# Patient Record
Sex: FEMALE | Race: WHITE | NOT HISPANIC OR LATINO | Employment: FULL TIME
[De-identification: names, ages, dates, MRNs, and addresses within clinical notes are randomized per-mention and may not be internally consistent; named-entity substitution may affect disease eponyms.]

---

## 2023-07-31 ENCOUNTER — TRANSCRIBE ORDERS (OUTPATIENT)
Dept: OTHER | Age: 58
End: 2023-07-31

## 2023-07-31 ENCOUNTER — PRE VISIT (OUTPATIENT)
Dept: ONCOLOGY | Facility: CLINIC | Age: 58
End: 2023-07-31
Payer: COMMERCIAL

## 2023-07-31 DIAGNOSIS — C50.919 BREAST CANCER (H): Primary | ICD-10-CM

## 2023-07-31 NOTE — TELEPHONE ENCOUNTER
Action    Action Taken 7/31/23  WT from NPS - pt originally diagnosed in 3/2023, all treatment through [facility]. Has not done Radiation Therapy yet. Has not been outside of  for a 2nd opinion, or anything else.  3:10 PM    8/2/23 - Images resolved in PACS. Records complete - MMT 2:34PM      RECORDS STATUS - BREAST    RECORDS REQUESTED FROM: [facility]   DATE REQUESTED:    NOTES DETAILS STATUS   OFFICE NOTE from medical oncologist CE -  Dr. Glenn Walters: 7/7/23   OFFICE NOTE from surgeon CE -  Dr. Gillian Martinez: 6/21/23   OPERATIVE REPORT [facility] 9/30/22: Cervical Cone w/ Loop Cautery and Colposcopy   MEDICATION LIST [facility]   CHEMOTHERAPY [facility] 7/28/23   LABS     PATHOLOGY REPORTS  (Tissue diagnosis, Stage, ER/SD percentage positive and intensity of staining, HER2 IHC, FISH, and all biopsies from breast and any distant metastasis)                     GENONOMIC TESTING     TYPE:   (Next Generation Sequencing, including Foundation One testing, and Oncotype score) [facility] - Received 8/1 3/2023   IMAGING (NEED IMAGES & REPORT)     CT SCANS Received 8/1 3/31/23: HP   MRI Received 8/1 3/23/23: HP   MAMMO Received 8/1 11/10/15 - 3/14/23: HP   ULTRASOUND Received 8/1 3/14/23: HP   BONE SCAN Received 8/1 3/31/23: HP

## 2023-08-17 DIAGNOSIS — C50.911 MALIGNANT NEOPLASM OF RIGHT BREAST (H): Primary | ICD-10-CM

## 2023-08-18 ENCOUNTER — PATIENT OUTREACH (OUTPATIENT)
Dept: ONCOLOGY | Facility: CLINIC | Age: 58
End: 2023-08-18
Payer: COMMERCIAL

## 2023-08-18 NOTE — PROGRESS NOTES
New Patient Oncology Nurse Navigator Note     Referring provider: Self Referral      Referring Clinic/Organization: [facility] is current place of treatment      Referred to (specialty:) Cancer Surgery     Requested provider (if applicable): Dr. Jacob Go     Date Referral Received: August 18, 2023     Evaluation for:  Breast cancer     Clinical History (per Nurse review of records provided):      Right breat cancer, triple negative, stage IIB (T2,N1,M0), Clinical prognostic stage IIIB (T2,N1,M0), G3, triple negative.    Genetic Counsellor visit 3/22/23 and genetic test 3/18/23 which came back negative for BRCA1/2 Panel. Her family Hx is negative for breast cancer.    Patient felt right breast mass with enlarged right axillary lymph node in March of this year. Last mammogram was in April 2022 with benign findings. Diagnostic mammogram with breast ultrasound done on 03/14/2023 showed right breast mass with suspicious internal calcification 2.5 cm at 11:00 anterior depth, in addition to enlarged lymph nodes in the right axilla.    Right breast mass at 11:00 2 cm from nipple and axillary lymph node were both biopsied on 03/14/2023 showed intraductal carcinoma, Monroe grade 3 , HR negative, HER2 negative (IHC 1+). Right Axillary lymph node biopsy came positive for metastatic ductal carcinoma.    Breast MRI on 03/23/2023 showed right breast mass at 11:00 measures 3.1 X 3.0 X 2.4 cm with enlarged right axillary lymph node. Also MRI showed abnormal enhancement and edema in the anterior aspect of the right rib just below the level of right breast malignancy this could be related to trauma or metastatic disease and more evaluation with PET scan was recommended. Unfortunately not approve PET scan and once scan with CT cap was ordered.     FINAL DIAGNOSIS 3/14/23  A. Breast, right, Mass 11:00 2cm FN, needle core biopsy:  Invasive ductal carcinoma, Monroe grade 3  Estrogen receptor/ progesterone receptor/ Her2  to follow in an addendum    B. Lymph node, RIGHT Axillary Lymph Node, needle core biopsy:  Metastatic ductal carcinoma    Estrogen Receptor (ER) Status: Negative (<1%)  Progesterone Receptor (MA): Negative (<1%)  HER2 by Immunohistochemistry: Negative (score: 1+)    EXAM: MM MAMMOGRAM JOEY BENTONAT W 3D FINA, MM US BREAST RT 3/14/23  INDICATION: Palpable lump in the right breast for one month  COMPARISON: Prior screening mammograms, most recently 04/01/2022    MAMMOGRAPHIC FINDINGS: Bilateral full-field digital diagnostic mammograms performed. The breasts are heterogeneously dense, which may obscure small masses. Images evaluated with the assistance of CAD. Breast tomosynthesis was used in interpretation. Mammogram shows a new mass with suspicious internal calcifications in the right breast at 11:00 anterior depth. Nothing for malignancy in the left breast. Enlarged lymph node in the right axilla.    ULTRASOUND FINDINGS: Ultrasound of the palpable lump shows an irregular hypoechoic mass with marked internal vascularity, and internal calcifications, measuring 2.5 x 1.7 x 2.2 cm. There are 2 abnormal lymph nodes in the right axilla with loss of normal fatty hilum and loss of normal morphology. The larger measures 2.2 x 1.7 x 1.5 cm.    IMPRESSION:  Highly suspicious right breast mass with right axillary adenopathy. Recommend 2 site biopsy, of the palpable right breast mass, and of the largest axillary node.    MRI breast 3/23/23  IMPRESSION: ACR BI-RADS Category 6: Known Biopsy-Proven Malignancy.  1. Biopsy-proven right breast cancer at 11:00 measuring 3.1 x 3.0 x 2.4 cm.    2. Enlarged right axillary lymph node representing known metastatic disease. There are 1-2 additional lymph nodes at level 1 with minimally thickened cortices but normal morphology.    3. Abnormal enhancement and edema in the anterior aspect of right rib just below the level of the right breast malignancy. This could be related to trauma or  metastatic disease. Further evaluation is recommended with cross-sectional imaging; the patient is scheduled for PET/CT on 03/29/2023.    CT CAP showed no evidence of distant metastasis.    Met with Dr. Glenn Walters with [facility] for consultation in 4/2023 and discussed the following plan: neoadjuvant treatment as per keynote 522 which include 2 phases of chemotherapy (carboplatin/paclitaxel plus pembrolizumab followed by Adriamycin/cyclophosphamide plus pembrolizumab) followed by surgery with lymph node dissection and radiation and if no residual disease patient will proceed with pembrolizumab to complete 1 year of treatment, however in case of residual disease then Xeloda can be offered.   RIGHT BREAST: ACR BI-RADS Category 6: Known Biopsy-Proven Malignancy.  LEFT BREAST: ACR BI-RADS Category 1: Negative.        Records Location: Care Everywhere     Records Needed: NA     Additional testing needed prior to consult:     Right breast and axilla US.     Payor: BCBS / Plan: BCBS OUT OF STATE / Product Type: Indemnity /     August 18, 2023    Called patient to introduced myself and role as nurse navigator with [facility] Hematology/Oncology department and to inform them that we have received a self referral for a diagnosis of breast cancer.     Patient did not answer the phone so a detailed message was left for them including NPS number. Requested callback to speak to a  to set the consult date/time/location. Explained to patient in the message that they will receive a call from our new patient scheduling team (NPS number below, hours are Monday - Friday 8am - 4:30 pm) in the next 1-2 business days to schedule the consultation. Encouraged them to call back with any questions.       Tammy Barba, RN, BSN  [facility] Hematology/Oncology Nurse Navigator  [phone]

## 2023-09-15 NOTE — TELEPHONE ENCOUNTER
RECORDS STATUS - BREAST    RECORDS REQUESTED FROM: [facility] - Please see Pre Visit  for Previous Records Gathering   DATE REQUESTED:    NOTES DETAILS STATUS   OFFICE NOTE from medical oncologist CE - HP Dr. Glenn Walters: 23   LABS     PATHOLOGY REPORTS  (Tissue diagnosis, Stage, ER/WI percentage positive and intensity of staining, HER2 IHC, FISH, and all biopsies from breast and any distant metastasis)                 [facility], Reports in CE, slides requested 9/15  [facility] Trackin 3/14/23: [id number]

## 2023-09-19 NOTE — PROGRESS NOTES
Path Slides September 19, 2023  12:33 PM  TJ   Action Taken Slides [id number] from [facility] received and sent to 5th floor path lab for review.

## 2023-09-21 ENCOUNTER — LAB REQUISITION (OUTPATIENT)
Dept: LAB | Facility: CLINIC | Age: 58
End: 2023-09-21
Payer: COMMERCIAL

## 2023-09-21 PROCEDURE — 88321 CONSLTJ&REPRT SLD PREP ELSWR: CPT | Performed by: STUDENT IN AN ORGANIZED HEALTH CARE EDUCATION/TRAINING PROGRAM

## 2023-09-24 LAB
PATH REPORT.COMMENTS IMP SPEC: ABNORMAL
PATH REPORT.COMMENTS IMP SPEC: YES
PATH REPORT.FINAL DX SPEC: ABNORMAL
PATH REPORT.GROSS SPEC: ABNORMAL
PATH REPORT.MICROSCOPIC SPEC OTHER STN: ABNORMAL
PATH REPORT.RELEVANT HX SPEC: ABNORMAL
PATH REPORT.RELEVANT HX SPEC: ABNORMAL
PATH REPORT.SITE OF ORIGIN SPEC: ABNORMAL

## 2023-09-25 ENCOUNTER — PRE VISIT (OUTPATIENT)
Dept: ONCOLOGY | Facility: CLINIC | Age: 58
End: 2023-09-25

## 2023-09-25 ENCOUNTER — ANCILLARY PROCEDURE (OUTPATIENT)
Dept: MAMMOGRAPHY | Facility: CLINIC | Age: 58
End: 2023-09-25
Attending: SURGERY
Payer: COMMERCIAL

## 2023-09-25 ENCOUNTER — PATIENT OUTREACH (OUTPATIENT)
Dept: ONCOLOGY | Facility: CLINIC | Age: 58
End: 2023-09-25

## 2023-09-25 ENCOUNTER — ONCOLOGY VISIT (OUTPATIENT)
Dept: ONCOLOGY | Facility: CLINIC | Age: 58
End: 2023-09-25
Attending: SURGERY
Payer: COMMERCIAL

## 2023-09-25 VITALS
HEART RATE: 99 BPM | DIASTOLIC BLOOD PRESSURE: 76 MMHG | SYSTOLIC BLOOD PRESSURE: 132 MMHG | BODY MASS INDEX: 25.08 KG/M2 | RESPIRATION RATE: 16 BRPM | OXYGEN SATURATION: 100 % | WEIGHT: 146.9 LBS | HEIGHT: 64 IN

## 2023-09-25 DIAGNOSIS — C50.911 MALIGNANT NEOPLASM OF RIGHT BREAST (H): ICD-10-CM

## 2023-09-25 DIAGNOSIS — C50.919 BREAST CANCER (H): ICD-10-CM

## 2023-09-25 PROCEDURE — 99213 OFFICE O/P EST LOW 20 MIN: CPT | Performed by: SURGERY

## 2023-09-25 PROCEDURE — 77066 DX MAMMO INCL CAD BI: CPT | Performed by: RADIOLOGY

## 2023-09-25 PROCEDURE — 76642 ULTRASOUND BREAST LIMITED: CPT | Mod: RT | Performed by: RADIOLOGY

## 2023-09-25 PROCEDURE — 99203 OFFICE O/P NEW LOW 30 MIN: CPT | Performed by: SURGERY

## 2023-09-25 PROCEDURE — G0279 TOMOSYNTHESIS, MAMMO: HCPCS | Performed by: RADIOLOGY

## 2023-09-25 RX ORDER — TRAZODONE HYDROCHLORIDE 50 MG/1
100 TABLET, FILM COATED ORAL AT BEDTIME
COMMUNITY
Start: 2023-09-19

## 2023-09-25 RX ORDER — GABAPENTIN 300 MG/1
300 CAPSULE ORAL AT BEDTIME
COMMUNITY
Start: 2023-08-18 | End: 2024-08-17

## 2023-09-25 RX ORDER — OLANZAPINE 5 MG/1
5 TABLET ORAL AT BEDTIME
COMMUNITY

## 2023-09-25 RX ORDER — ONDANSETRON 8 MG/1
8 TABLET, FILM COATED ORAL EVERY 8 HOURS PRN
COMMUNITY
Start: 2023-07-28

## 2023-09-25 RX ORDER — LIDOCAINE/PRILOCAINE 2.5 %-2.5%
CREAM (GRAM) TOPICAL PRN
COMMUNITY
Start: 2023-04-14

## 2023-09-25 RX ORDER — PROCHLORPERAZINE MALEATE 10 MG
10 TABLET ORAL EVERY 8 HOURS PRN
COMMUNITY

## 2023-09-25 RX ORDER — DEXTROAMPHETAMINE SACCHARATE, AMPHETAMINE ASPARTATE, DEXTROAMPHETAMINE SULFATE AND AMPHETAMINE SULFATE 3.75; 3.75; 3.75; 3.75 MG/1; MG/1; MG/1; MG/1
15 TABLET ORAL DAILY
COMMUNITY

## 2023-09-25 RX ORDER — AMLODIPINE BESYLATE 5 MG/1
5 TABLET ORAL DAILY
COMMUNITY

## 2023-09-25 RX ORDER — DEXAMETHASONE 4 MG/1
4 TABLET ORAL 2 TIMES DAILY WITH MEALS
COMMUNITY

## 2023-09-25 ASSESSMENT — PAIN SCALES - GENERAL: PAINLEVEL: NO PAIN (0)

## 2023-09-25 NOTE — LETTER
9/25/2023         RE: Marline Ovalle  [address]  [place] [address]        Dear Colleague,    Thank you for referring your patient, Marline Ovalle, to the [facility]. Please see a copy of my visit note below.    Patient is a 58-year-old woman with a diagnosis of a T2N0 triple negative breast cancer.  She was found to have a 2.7 cm right breast cancer with a palpable enlarged right axillary lymph node.  She underwent biopsy of both her breast and her lymph node in March which demonstrated triple negative breast cancer.  She had a metastatic work-up which was negative.  She underwent genetic testing which was negative.  She has received chemotherapy and pembrolizumab.  She had a follow-up mammogram and ultrasound today.  The mammogram and ultrasound demonstrate significant reduction in the size of both the lymph node and the breast tumor.  She is now here to talk about treatment options.    Impression: T2 N1 M0 triple negative breast cancer    Plan: I talked to her about various treatment options.  She does desire breast conserving surgery.  I have recommended a seed localized lumpectomy.  I recommended a sentinel lymph node biopsy with a seed localized excision of her previously clipped lymph node.  I talked about the risk and complications of the procedure and she wishes to proceed.    Total time equals 30 minutes which included reviewing her imaging and the in person visit      Jacob Go MD

## 2023-09-25 NOTE — NURSING NOTE
"Oncology Rooming Note    September 25, 2023 4:24 PM   Marline Ovalle is a 58 year old female who presents for:    Chief Complaint   Patient presents with    Oncology Clinic Visit     Breast Cancer Second opinion     Initial Vitals: /76 (BP Location: Right arm, Patient Position: Sitting, Cuff Size: Adult Regular)   Pulse 99   Resp 16   Ht 1.626 m (5' 4\")   Wt 66.6 kg (146 lb 14.4 oz)   SpO2 100%   BMI 25.22 kg/m   Estimated body mass index is 25.22 kg/m  as calculated from the following:    Height as of this encounter: 1.626 m (5' 4\").    Weight as of this encounter: 66.6 kg (146 lb 14.4 oz). Body surface area is 1.73 meters squared.  No Pain (0) Comment: Data Unavailable   No LMP recorded.  Allergies reviewed: Yes  Medications reviewed: Yes    Medications: Medication refills not needed today.  Pharmacy name entered into [employer]: [id number] - [place], [place] - [address]    Clinical concerns: Second Opinion       Anya Hsieh LPN  9/25/2023              "

## 2023-09-25 NOTE — PROGRESS NOTES
Patient is a 58-year-old woman with a diagnosis of a T2N0 triple negative breast cancer.  She was found to have a 2.7 cm right breast cancer with a palpable enlarged right axillary lymph node.  She underwent biopsy of both her breast and her lymph node in March which demonstrated triple negative breast cancer.  She had a metastatic work-up which was negative.  She underwent genetic testing which was negative.  She has received chemotherapy and pembrolizumab.  She had a follow-up mammogram and ultrasound today.  The mammogram and ultrasound demonstrate significant reduction in the size of both the lymph node and the breast tumor.  She is now here to talk about treatment options.    Impression: T2 N1 M0 triple negative breast cancer    Plan: I talked to her about various treatment options.  She does desire breast conserving surgery.  I have recommended a seed localized lumpectomy.  I recommended a sentinel lymph node biopsy with a seed localized excision of her previously clipped lymph node.  I talked about the risk and complications of the procedure and she wishes to proceed.    Total time equals 30 minutes which included reviewing her imaging and the in person visit

## 2023-09-25 NOTE — PROGRESS NOTES
[facility]: Surgical Oncology Plan of Care Education Note                                     Discussion with Patient:                                                         Met with Marline following her visit with Dr. Go on 9/25/2023. Introduced self and explained role of RNCC.       Plan:                                                       Reviewed plan for right tag localized lumpectomy and tag localized sentinel lymph node biopsy at the [facility]. Discussed need for H&P within 30 days of surgery. Marline prefers to schedule with her PCP. Reviewed shower instructions and provided written hand-out and bottle of surgical scrub.     The process for scheduling the tag placement was reviewed with Marline. She is aware [facility] will be calling to schedule the tag placement at least two days prior to her surgery.     Informed patient they should get a call within the next three business days from our OR  with surgery date, H&P date and date of post-op visit with their surgeon. Writer answered all questions and concerns to the best of her ability and provided her contact information. Reviewed use of [email or web address] as alternative way to contact team.  Encouraged patient to contact with questions.         Lexus Graham, RNROSELIA  [facility]  Surgical Onolcogy      Approximately 5 minutes was spent in discussion with patient.

## 2023-09-26 ENCOUNTER — TELEPHONE (OUTPATIENT)
Dept: ONCOLOGY | Facility: CLINIC | Age: 58
End: 2023-09-26
Payer: COMMERCIAL

## 2023-09-26 PROBLEM — C50.919 BREAST CANCER (H): Status: ACTIVE | Noted: 2023-09-25

## 2023-09-27 ENCOUNTER — TELEPHONE (OUTPATIENT)
Dept: MAMMOGRAPHY | Facility: CLINIC | Age: 58
End: 2023-09-27
Payer: COMMERCIAL

## 2023-09-27 NOTE — TELEPHONE ENCOUNTER
Left a message for Marline regarding scheduling her pre-operative localizer placement.  Awaiting return phone call.  Callback number left was [phone].

## 2023-10-12 ENCOUNTER — ANCILLARY PROCEDURE (OUTPATIENT)
Dept: MAMMOGRAPHY | Facility: CLINIC | Age: 58
End: 2023-10-12
Attending: SURGERY
Payer: COMMERCIAL

## 2023-10-12 ENCOUNTER — PATIENT OUTREACH (OUTPATIENT)
Dept: ONCOLOGY | Facility: CLINIC | Age: 58
End: 2023-10-12

## 2023-10-12 DIAGNOSIS — Z85.3 PERSONAL HISTORY OF MALIGNANT NEOPLASM OF BREAST: ICD-10-CM

## 2023-10-12 PROCEDURE — 19286 PERQ DEV BREAST ADD US IMAG: CPT | Mod: RT | Performed by: RADIOLOGY

## 2023-10-12 PROCEDURE — 19285 PERQ DEV BREAST 1ST US IMAG: CPT | Mod: RT | Performed by: RADIOLOGY

## 2023-10-12 NOTE — PROGRESS NOTES
[facility]: Surgical Oncology Cancer Care Short Note                                     Discussion with Patient:                                                       INBOUND CALL:     Received call from Marline stating she developed a cold two days ago. She stated she has nasal congestion and a slight cough. Her temp was 99 on 10/11 but is normal today.     Recommended Marline proceed with her tag placement today. Recommended she call the pre-admission nurse team to relay cold symptoms. Requested she test for Covid on 10/15, the day prior to her surgery, and bring a photo of the negative test.     Encouraged Marline to call if her symptoms worsen or she has any additional questions or concerns.      Lexus Graham, RNCC  [facility]   Surgical Oncology

## 2023-10-13 ENCOUNTER — PATIENT OUTREACH (OUTPATIENT)
Dept: ONCOLOGY | Facility: CLINIC | Age: 58
End: 2023-10-13
Payer: COMMERCIAL

## 2023-10-13 NOTE — PROGRESS NOTES
Surgery is rescheduled with Dr. Go    Date: 10/23   [facility]    No changes to post-op appointment. Keeping on 11/3 per RNCC Lexus.    Nuc Med delivery rescheduled to 10/23.    Patient will receive a phone call from pre-admission nurses 1-2 days prior to surgery with arrival and start time.     RNCC to contact patient. RNCC will contact writer if writer needs to reach out to the patient. No further action needed at this time.     __    Farhana Lechuga, Senior Perioperative Coordinator, on 10/13/2023 at 1:59 PM  P: [phone]

## 2023-10-13 NOTE — PROGRESS NOTES
[facility]: Surgical Oncology Cancer Care Short Note                                     Discussion with Patient:                                                         INBOUND CALL:     Received call from Mraline. She stated she tested positive for Covid on 10/13. She has mild cold-like symptoms and her PCP has prescribed Paxlovid.     Marline's is aware her surgery on 10/16 with Dr. Go is being cancelled and will be rescheduled for the next available date after the 10 day wait period.     Encouraged Marline to call with any further questions or concerns.     Lexus Graham, RNCC  [facility]   Surgical Oncology

## 2023-10-20 ENCOUNTER — ANESTHESIA EVENT (OUTPATIENT)
Dept: SURGERY | Facility: AMBULATORY SURGERY CENTER | Age: 58
End: 2023-10-20
Payer: COMMERCIAL

## 2023-10-22 ENCOUNTER — HEALTH MAINTENANCE LETTER (OUTPATIENT)
Age: 58
End: 2023-10-22

## 2023-10-23 ENCOUNTER — ANCILLARY PROCEDURE (OUTPATIENT)
Dept: MAMMOGRAPHY | Facility: CLINIC | Age: 58
End: 2023-10-23
Attending: SURGERY
Payer: COMMERCIAL

## 2023-10-23 ENCOUNTER — HOSPITAL ENCOUNTER (OUTPATIENT)
Dept: NUCLEAR MEDICINE | Facility: CLINIC | Age: 58
Setting detail: NUCLEAR MEDICINE
Discharge: HOME | End: 2023-10-23
Attending: SURGERY | Admitting: SURGERY
Payer: COMMERCIAL

## 2023-10-23 ENCOUNTER — ANESTHESIA (OUTPATIENT)
Dept: SURGERY | Facility: AMBULATORY SURGERY CENTER | Age: 58
End: 2023-10-23
Payer: COMMERCIAL

## 2023-10-23 ENCOUNTER — HOSPITAL ENCOUNTER (OUTPATIENT)
Facility: AMBULATORY SURGERY CENTER | Age: 58
Discharge: HOME | End: 2023-10-23
Attending: SURGERY
Payer: COMMERCIAL

## 2023-10-23 VITALS
DIASTOLIC BLOOD PRESSURE: 63 MMHG | WEIGHT: 140 LBS | HEIGHT: 64 IN | OXYGEN SATURATION: 97 % | HEART RATE: 82 BPM | BODY MASS INDEX: 23.9 KG/M2 | RESPIRATION RATE: 16 BRPM | SYSTOLIC BLOOD PRESSURE: 119 MMHG | TEMPERATURE: 97 F

## 2023-10-23 DIAGNOSIS — C50.919 BREAST CANCER (H): ICD-10-CM

## 2023-10-23 PROCEDURE — 19301 PARTIAL MASTECTOMY: CPT | Mod: RT

## 2023-10-23 PROCEDURE — 38792 RA TRACER ID OF SENTINL NODE: CPT | Mod: RT

## 2023-10-23 PROCEDURE — A9520 TC99 TILMANOCEPT DIAG 0.5MCI: HCPCS

## 2023-10-23 PROCEDURE — 76098 X-RAY EXAM SURGICAL SPECIMEN: CPT

## 2023-10-23 PROCEDURE — 99207 NM LYMPHOSCINTIGRAPHY INJECTION ONLY: CPT | Performed by: RADIOLOGY

## 2023-10-23 PROCEDURE — 38792 RA TRACER ID OF SENTINL NODE: CPT

## 2023-10-23 PROCEDURE — 19301 PARTIAL MASTECTOMY: CPT | Mod: RT | Performed by: SURGERY

## 2023-10-23 PROCEDURE — 88307 TISSUE EXAM BY PATHOLOGIST: CPT | Mod: TC | Performed by: SURGERY

## 2023-10-23 PROCEDURE — 88307 TISSUE EXAM BY PATHOLOGIST: CPT | Mod: 26 | Performed by: PATHOLOGY

## 2023-10-23 PROCEDURE — 38900 IO MAP OF SENT LYMPH NODE: CPT | Mod: RT | Performed by: SURGERY

## 2023-10-23 PROCEDURE — 38525 BIOPSY/REMOVAL LYMPH NODES: CPT | Mod: RT

## 2023-10-23 PROCEDURE — 38525 BIOPSY/REMOVAL LYMPH NODES: CPT | Mod: RT | Performed by: SURGERY

## 2023-10-23 RX ORDER — DEXAMETHASONE SODIUM PHOSPHATE 4 MG/ML
INJECTION, SOLUTION INTRA-ARTICULAR; INTRALESIONAL; INTRAMUSCULAR; INTRAVENOUS; SOFT TISSUE PRN
Status: DISCONTINUED | OUTPATIENT
Start: 2023-10-23 | End: 2023-10-23

## 2023-10-23 RX ORDER — GLYCOPYRROLATE 0.2 MG/ML
INJECTION, SOLUTION INTRAMUSCULAR; INTRAVENOUS PRN
Status: DISCONTINUED | OUTPATIENT
Start: 2023-10-23 | End: 2023-10-23

## 2023-10-23 RX ORDER — OXYCODONE HYDROCHLORIDE 5 MG/1
10 TABLET ORAL
Status: DISCONTINUED | OUTPATIENT
Start: 2023-10-23 | End: 2023-10-24 | Stop reason: HOSPADM

## 2023-10-23 RX ORDER — FENTANYL CITRATE 50 UG/ML
50 INJECTION, SOLUTION INTRAMUSCULAR; INTRAVENOUS EVERY 5 MIN PRN
Status: DISCONTINUED | OUTPATIENT
Start: 2023-10-23 | End: 2023-10-23 | Stop reason: HOSPADM

## 2023-10-23 RX ORDER — HYDROMORPHONE HYDROCHLORIDE 1 MG/ML
0.2 INJECTION, SOLUTION INTRAMUSCULAR; INTRAVENOUS; SUBCUTANEOUS EVERY 5 MIN PRN
Status: DISCONTINUED | OUTPATIENT
Start: 2023-10-23 | End: 2023-10-23 | Stop reason: HOSPADM

## 2023-10-23 RX ORDER — HYDROMORPHONE HYDROCHLORIDE 1 MG/ML
0.4 INJECTION, SOLUTION INTRAMUSCULAR; INTRAVENOUS; SUBCUTANEOUS EVERY 5 MIN PRN
Status: DISCONTINUED | OUTPATIENT
Start: 2023-10-23 | End: 2023-10-23 | Stop reason: HOSPADM

## 2023-10-23 RX ORDER — OXYCODONE HYDROCHLORIDE 5 MG/1
5 TABLET ORAL
Status: COMPLETED | OUTPATIENT
Start: 2023-10-23 | End: 2023-10-23

## 2023-10-23 RX ORDER — SODIUM CHLORIDE, SODIUM LACTATE, POTASSIUM CHLORIDE, CALCIUM CHLORIDE 600; 310; 30; 20 MG/100ML; MG/100ML; MG/100ML; MG/100ML
INJECTION, SOLUTION INTRAVENOUS CONTINUOUS
Status: DISCONTINUED | OUTPATIENT
Start: 2023-10-23 | End: 2023-10-23 | Stop reason: HOSPADM

## 2023-10-23 RX ORDER — ONDANSETRON 4 MG/1
4 TABLET, ORALLY DISINTEGRATING ORAL EVERY 30 MIN PRN
Status: DISCONTINUED | OUTPATIENT
Start: 2023-10-23 | End: 2023-10-24 | Stop reason: HOSPADM

## 2023-10-23 RX ORDER — FENTANYL CITRATE 50 UG/ML
INJECTION, SOLUTION INTRAMUSCULAR; INTRAVENOUS PRN
Status: DISCONTINUED | OUTPATIENT
Start: 2023-10-23 | End: 2023-10-23

## 2023-10-23 RX ORDER — BUPIVACAINE HYDROCHLORIDE 2.5 MG/ML
INJECTION, SOLUTION INFILTRATION; PERINEURAL PRN
Status: DISCONTINUED | OUTPATIENT
Start: 2023-10-23 | End: 2023-10-23 | Stop reason: HOSPADM

## 2023-10-23 RX ORDER — PROPOFOL 10 MG/ML
INJECTION, EMULSION INTRAVENOUS PRN
Status: DISCONTINUED | OUTPATIENT
Start: 2023-10-23 | End: 2023-10-23

## 2023-10-23 RX ORDER — KETOROLAC TROMETHAMINE 30 MG/ML
INJECTION, SOLUTION INTRAMUSCULAR; INTRAVENOUS PRN
Status: DISCONTINUED | OUTPATIENT
Start: 2023-10-23 | End: 2023-10-23

## 2023-10-23 RX ORDER — HYDRALAZINE HYDROCHLORIDE 20 MG/ML
2.5-5 INJECTION INTRAMUSCULAR; INTRAVENOUS EVERY 10 MIN PRN
Status: DISCONTINUED | OUTPATIENT
Start: 2023-10-23 | End: 2023-10-23 | Stop reason: HOSPADM

## 2023-10-23 RX ORDER — LIDOCAINE HYDROCHLORIDE 20 MG/ML
INJECTION, SOLUTION INFILTRATION; PERINEURAL PRN
Status: DISCONTINUED | OUTPATIENT
Start: 2023-10-23 | End: 2023-10-23

## 2023-10-23 RX ORDER — INDOCYANINE GREEN AND WATER 25 MG
2.5 KIT INJECTION ONCE
Status: DISCONTINUED | OUTPATIENT
Start: 2023-10-23 | End: 2023-10-23 | Stop reason: HOSPADM

## 2023-10-23 RX ORDER — ONDANSETRON 2 MG/ML
4 INJECTION INTRAMUSCULAR; INTRAVENOUS EVERY 30 MIN PRN
Status: DISCONTINUED | OUTPATIENT
Start: 2023-10-23 | End: 2023-10-24 | Stop reason: HOSPADM

## 2023-10-23 RX ORDER — OXYCODONE HYDROCHLORIDE 5 MG/1
5 TABLET ORAL
Status: DISCONTINUED | OUTPATIENT
Start: 2023-10-23 | End: 2023-10-24 | Stop reason: HOSPADM

## 2023-10-23 RX ORDER — CLINDAMYCIN PHOSPHATE 900 MG/50ML
900 INJECTION, SOLUTION INTRAVENOUS SEE ADMIN INSTRUCTIONS
Status: DISCONTINUED | OUTPATIENT
Start: 2023-10-23 | End: 2023-10-23 | Stop reason: HOSPADM

## 2023-10-23 RX ORDER — ONDANSETRON 2 MG/ML
INJECTION INTRAMUSCULAR; INTRAVENOUS PRN
Status: DISCONTINUED | OUTPATIENT
Start: 2023-10-23 | End: 2023-10-23

## 2023-10-23 RX ORDER — LABETALOL HYDROCHLORIDE 5 MG/ML
10 INJECTION, SOLUTION INTRAVENOUS
Status: DISCONTINUED | OUTPATIENT
Start: 2023-10-23 | End: 2023-10-23 | Stop reason: HOSPADM

## 2023-10-23 RX ORDER — LIDOCAINE 40 MG/G
CREAM TOPICAL
Status: DISCONTINUED | OUTPATIENT
Start: 2023-10-23 | End: 2023-10-23 | Stop reason: HOSPADM

## 2023-10-23 RX ORDER — ONDANSETRON 2 MG/ML
4 INJECTION INTRAMUSCULAR; INTRAVENOUS EVERY 30 MIN PRN
Status: DISCONTINUED | OUTPATIENT
Start: 2023-10-23 | End: 2023-10-23 | Stop reason: HOSPADM

## 2023-10-23 RX ORDER — ONDANSETRON 4 MG/1
4 TABLET, ORALLY DISINTEGRATING ORAL EVERY 30 MIN PRN
Status: DISCONTINUED | OUTPATIENT
Start: 2023-10-23 | End: 2023-10-23 | Stop reason: HOSPADM

## 2023-10-23 RX ORDER — FENTANYL CITRATE 50 UG/ML
25 INJECTION, SOLUTION INTRAMUSCULAR; INTRAVENOUS EVERY 5 MIN PRN
Status: DISCONTINUED | OUTPATIENT
Start: 2023-10-23 | End: 2023-10-23 | Stop reason: HOSPADM

## 2023-10-23 RX ORDER — PROPOFOL 10 MG/ML
INJECTION, EMULSION INTRAVENOUS CONTINUOUS PRN
Status: DISCONTINUED | OUTPATIENT
Start: 2023-10-23 | End: 2023-10-23

## 2023-10-23 RX ORDER — ACETAMINOPHEN 325 MG/1
650 TABLET ORAL
Status: DISCONTINUED | OUTPATIENT
Start: 2023-10-23 | End: 2023-10-24 | Stop reason: HOSPADM

## 2023-10-23 RX ORDER — INDOCYANINE GREEN AND WATER 25 MG
KIT INJECTION PRN
Status: DISCONTINUED | OUTPATIENT
Start: 2023-10-23 | End: 2023-10-23 | Stop reason: HOSPADM

## 2023-10-23 RX ORDER — ACETAMINOPHEN 325 MG/1
975 TABLET ORAL ONCE
Status: COMPLETED | OUTPATIENT
Start: 2023-10-23 | End: 2023-10-23

## 2023-10-23 RX ORDER — CLINDAMYCIN PHOSPHATE 900 MG/50ML
900 INJECTION, SOLUTION INTRAVENOUS
Status: COMPLETED | OUTPATIENT
Start: 2023-10-23 | End: 2023-10-23

## 2023-10-23 RX ADMIN — Medication 100 MCG: at 10:23

## 2023-10-23 RX ADMIN — PROPOFOL 200 MG: 10 INJECTION, EMULSION INTRAVENOUS at 09:47

## 2023-10-23 RX ADMIN — FENTANYL CITRATE 50 MCG: 50 INJECTION, SOLUTION INTRAMUSCULAR; INTRAVENOUS at 10:39

## 2023-10-23 RX ADMIN — SODIUM CHLORIDE, SODIUM LACTATE, POTASSIUM CHLORIDE, CALCIUM CHLORIDE: 600; 310; 30; 20 INJECTION, SOLUTION INTRAVENOUS at 08:52

## 2023-10-23 RX ADMIN — ONDANSETRON 4 MG: 4 TABLET, ORALLY DISINTEGRATING ORAL at 12:17

## 2023-10-23 RX ADMIN — PROPOFOL 130 MCG/KG/MIN: 10 INJECTION, EMULSION INTRAVENOUS at 10:41

## 2023-10-23 RX ADMIN — LIDOCAINE HYDROCHLORIDE 100 MG: 20 INJECTION, SOLUTION INFILTRATION; PERINEURAL at 09:47

## 2023-10-23 RX ADMIN — DEXAMETHASONE SODIUM PHOSPHATE 4 MG: 4 INJECTION, SOLUTION INTRA-ARTICULAR; INTRALESIONAL; INTRAMUSCULAR; INTRAVENOUS; SOFT TISSUE at 10:00

## 2023-10-23 RX ADMIN — PROPOFOL 150 MCG/KG/MIN: 10 INJECTION, EMULSION INTRAVENOUS at 09:47

## 2023-10-23 RX ADMIN — PROPOFOL 50 MG: 10 INJECTION, EMULSION INTRAVENOUS at 10:39

## 2023-10-23 RX ADMIN — OXYCODONE HYDROCHLORIDE 5 MG: 5 TABLET ORAL at 11:43

## 2023-10-23 RX ADMIN — ONDANSETRON 4 MG: 2 INJECTION INTRAMUSCULAR; INTRAVENOUS at 10:00

## 2023-10-23 RX ADMIN — Medication 50 MCG: at 10:09

## 2023-10-23 RX ADMIN — GLYCOPYRROLATE 0.2 MG: 0.2 INJECTION, SOLUTION INTRAMUSCULAR; INTRAVENOUS at 10:00

## 2023-10-23 RX ADMIN — KETOROLAC TROMETHAMINE 30 MG: 30 INJECTION, SOLUTION INTRAMUSCULAR; INTRAVENOUS at 10:54

## 2023-10-23 RX ADMIN — ACETAMINOPHEN 975 MG: 325 TABLET ORAL at 08:56

## 2023-10-23 RX ADMIN — FENTANYL CITRATE 50 MCG: 50 INJECTION, SOLUTION INTRAMUSCULAR; INTRAVENOUS at 09:54

## 2023-10-23 RX ADMIN — Medication 100 MCG: at 10:14

## 2023-10-23 RX ADMIN — CLINDAMYCIN PHOSPHATE 900 MG: 900 INJECTION, SOLUTION INTRAVENOUS at 09:40

## 2023-10-23 NOTE — ANESTHESIA POSTPROCEDURE EVALUATION
Patient: Marline HARRIS [facility]    Procedure: Procedure(s):  RIGHT BREAST LUMPECTOMY LOCALIZED USING RADIOFREQUENCY IDENTIFICATION  RIGHT SENTINEL LYMPH NODE BIOPSY LOCALIZED USING RADIOFREQUENCY IDENTIFICATION       Anesthesia Type:  General    Note:  Disposition: Outpatient   Postop Pain Control: Uneventful            Sign Out: Well controlled pain   PONV:    Neuro/Psych: Uneventful            Sign Out: Acceptable/Baseline neuro status   Airway/Respiratory: Uneventful            Sign Out: Acceptable/Baseline resp. status   CV/Hemodynamics: Uneventful            Sign Out: Acceptable CV status; No obvious hypovolemia; No obvious fluid overload   Other NRE: NONE   DID A NON-ROUTINE EVENT OCCUR?            Last vitals:  Vitals Value Taken Time   /71 10/23/23 1145   Temp     Pulse 82 10/23/23 1145   Resp 16 10/23/23 1145   SpO2 97 % 10/23/23 1145       Electronically Signed By: Birdie Quigley MD  October 23, 2023  12:27 PM

## 2023-10-23 NOTE — ANESTHESIA PREPROCEDURE EVALUATION
"Anesthesia Pre-Procedure Evaluation    Patient: Marline HARRIS [facility]   MRN: [id number] : 1965        Procedure : Procedure(s):  RIGHT BREAST LUMPECTOMY LOCALIZED USING RADIOFREQUENCY IDENTIFICATION  RIGHT SENTINEL LYMPH NODE BIOPSY LOCALIZED USING RADIOFREQUENCY IDENTIFICATION          History reviewed. No pertinent past medical history.   Past Surgical History:   Procedure Laterality Date    IR LYMPH NODE BIOPSY  3/14/2023      Allergies   Allergen Reactions    Amoxicillin Hives    Cephalexin Hives    Vancomycin Itching     Received about 750mg before she started itching (started on her head)- no hives visible.      Social History     Tobacco Use    Smoking status: Not on file    Smokeless tobacco: Not on file   Substance Use Topics    Alcohol use: Not on file      Wt Readings from Last 1 Encounters:   10/23/23 63.5 kg (140 lb)           Physical Exam    Airway        Mallampati: II   TM distance: > 3 FB   Neck ROM: full   Mouth opening: > 3 cm    Respiratory Devices and Support         Dental       (+) Minor Abnormalities - some fillings, tiny chips      Cardiovascular   cardiovascular exam normal          Pulmonary   pulmonary exam normal                OUTSIDE LABS:  CBC: No results found for: \"WBC\", \"HGB\", \"HCT\", \"PLT\"  BMP: No results found for: \"NA\", \"POTASSIUM\", \"CHLORIDE\", \"CO2\", \"BUN\", \"CR\", \"GLC\"  COAGS: No results found for: \"PTT\", \"INR\", \"FIBR\"  POC: No results found for: \"BGM\", \"HCG\", \"HCGS\"  HEPATIC: No results found for: \"ALBUMIN\", \"PROTTOTAL\", \"ALT\", \"AST\", \"GGT\", \"ALKPHOS\", \"BILITOTAL\", \"BILIDIRECT\", \"JAMAAL\"  OTHER: No results found for: \"PH\", \"LACT\", \"A1C\", \"NIEVES\", \"PHOS\", \"MAG\", \"LIPASE\", \"AMYLASE\", \"TSH\", \"T4\", \"T3\", \"CRP\", \"SED\"    Anesthesia Plan    ASA Status:  2    NPO Status:  NPO Appropriate    Anesthesia Type: General.     - Airway: LMA      Maintenance: TIVA.        Consents    Anesthesia Plan(s) and associated risks, benefits, and realistic alternatives discussed. Questions " answered and patient/representative(s) expressed understanding.     - Discussed: Risks, Benefits and Alternatives for BOTH SEDATION and the PROCEDURE were discussed     - Discussed with:  Patient      - Extended Intubation/Ventilatory Support Discussed: No.      - Patient is DNR/DNI Status: No     Use of blood products discussed: No .     Postoperative Care    Pain management: Multi-modal analgesia.   PONV prophylaxis: Ondansetron (or other 5HT-3)     Comments:                Birdie Quigley MD

## 2023-10-23 NOTE — ANESTHESIA PROCEDURE NOTES
Airway       Patient location during procedure: OR       Procedure Start/Stop Times: 10/23/2023 9:48 AM  Staff -        CRNA: Celine Hinojosa       Performed By: CRNA  Consent for Airway        Urgency: elective  Indications and Patient Condition       Indications for airway management: bryan-procedural       Induction type:intravenous       Mask difficulty assessment: 1 - vent by mask    Final Airway Details       Final airway type: supraglottic airway    Supraglottic Airway Details        Type: LMA       Brand: LMA Unique       LMA size: 4    Post intubation assessment        Placement verified by: capnometry, equal breath sounds and chest rise        Number of attempts at approach: 1       Number of other approaches attempted: 0       Secured with: silk tape       Ease of procedure: easy       Dentition: Intact and Unchanged    Medication(s) Administered   Medication Administration Time: 10/23/2023 9:48 AM

## 2023-10-23 NOTE — OP NOTE
Preoperative Diagnosis: Right breast cancer    Postoperative Diagnosis: Same    Surgeons: Dr. Jacob Go and Dr. Carlos Bartlett    Procedure: Seed localized right lumpectomy, lymphatic mapping, right sentinel lymph node biopsy x2, and mastopexy advancement flaps    Anesthesia: General    Indications for Surgery: The patient is a 58-year-old woman who presented with a T2N1 breast cancer.  She was treated with neoadjuvant chemotherapy.  She is desires breast conserving surgery.  A radiofrequency seed was placed into her breast and into her axilla lymph node.    Procedure in Detail: After informed consent the patient was brought the operating room and given a general anesthetic.  I injected technetium sulfur colloid and ICG into her right breast.  She was prepped and draped in the usual fashion.  Using the localizer I identified the radiofrequency seed in the upper portion of her right breast.  A local anesthetic was administered.  A curvilinear incision was made at the superior aspect of the nipple-areolar complex.  The Bovie cautery was used to incise the subcutaneous tissues.  I dissected circumferentially around the radiofrequency seed to ensure adequate surgical margins.  The fascia was removed from the pectoralis major muscle.  The specimen was removed oriented and sent to the breast center.  Specimen radiograph demonstrated complete incision of the targeted lesion.  I placed 4 clips in the resection bed to facilitate radiation therapy.  To minimize the defect beneath her nipple I mobilized the breast tissue medially and laterally.  A mastopexy advancement flaps were created by freeing up the breast tissue off of the skin anteriorly and off the fascia posteriorly.  I then reapproximated the breast tissue with a 3-0 Vicryl suture.  The dermis was closed with interrupted 3-0 Vicryl suture and the skin was closed with a running 4-0 PDS subcuticular stitch.  Next identified a transcutaneous hotspot in the right  axilla.  A local anesthetic was administered and a transverse axillary incision was made with a scalpel.  The Bovie cautery was used to incise the subcutaneous tissues.  The fascia was incised and I identified a radioactive fluorescent lymph node.  This lymph node was removed and had ex vivo counts of 800 counts per second and contained the radiofrequency seed.  I identified a second radioactive fluorescent lymph node this lymph node was removed and labeled as sentinel lymph node #2.  It had ex vivo counts of 230 counts per second.  After removal of the second sentinel lymph node there were no additional fluorescent palpable or radioactive lymph nodes.  This incision was closed in a similar way with interrupted 3-0 Vicryl suture for the dermal layer and a running 4-0 PDS subcuticular stitch for the skin Dermabond was placed on both incisions and the patient was taken to the recovery room      [place] Node Biopsy for Breast Cancer - Right  Operation performed with curative intent Yes   Tracer(s) used to identify sentinel nodes in the upfront surgery (non-neoadjuvant) setting Dye   Tracer(s) used to identify sentinel nodes in the neoadjuvant setting Radioactive tracer   All nodes (colored or non-colored) present at the end of a dye-filled lymphatic channel were removed Yes   All significantly radioactive nodes were removed Yes   All palpably suspicious nodes were removed Yes   Biopsy-proven positive nodes marked with clips prior to chemotherapy were identified and removed Yes         Jacob Go MD, MS  Surgical Oncology

## 2023-10-23 NOTE — DISCHARGE INSTRUCTIONS
[facility]  Home Care Following Anesthesia  For 24 hours after surgery:  Get plenty of rest.  A responsible adult must stay with you for at least 24 hours after you leave the surgery center.  Do not drive or use heavy equipment.  If you have weakness or tingling, don't drive or use heavy equipment until this feeling goes away.   Do not drink alcohol.   Avoid strenuous or risky activities.  Ask for help when climbing stairs.  You may feel lightheaded.  IF so, sit for a few minutes before standing.  Have someone help you get up.   If you have nausea (feel sick to your stomach): Drink only clear liquids such as apple juice, ginger ale, broth or 7-Up.  Rest may also help.  Be sure to drink enough fluids.  Move to a regular diet as you feel able.   You may have a slight fever.  Call the doctor if your fever is over 100 F (37.7 C) (taken under the tongue) or lasts longer than 24 hours.  You may have a dry mouth, a sore throat, muscle aches or trouble sleeping. These should go away after 24 hours.  Do not make important or legal decisions.   It is recommended to avoid smoking.               Tips for taking pain medications  To get the best pain relief possible, remember these points:  Take pain medications as directed, before pain becomes severe.  Pain medication can upset your stomach: taking it with food may help.  Constipation is a common side effect of pain medication. Drink plenty of  fluids.  Eat foods high in fiber. Take a stool softener if recommended by your doctor or pharmacist.  Do not drink alcohol, drive or operate machinery while taking pain medications.  Ask about other ways to control pain, such as with heat, ice or relaxation.    Tylenol/Acetaminophen Consumption    If you feel your pain relief is insufficient, you may take Tylenol/Acetaminophen in addition to your narcotic pain medication.   Be careful not to exceed 4,000 mg of Tylenol/Acetaminophen in a 24 hour  period from all sources.  If you are taking extra strength Tylenol/acetaminophen (500 mg), the maximum dose is 8 tablets in 24 hours.  If you are taking regular strength acetaminophen (325 mg), the maximum dose is 12 tablets in 24 hours.  You had 975mg of tyelenol at 856am, do not repeat before 256pm today    Call a doctor for any of the following:  Signs of infection (fever, growing tenderness at the surgery site, a large amount of drainage or bleeding, severe pain, foul-smelling drainage, redness, swelling).  It has been over 8 to 10 hours since surgery and you are still not able to urinate (pass water).  Headache for over 24 hours.  Signs of Covid-19 infection (temperature over 100 degrees, shortness of breath, cough, loss of taste/smell, generalized body aches, persistent headache, chills, sore throat, nausea/vomiting/diarrhea)  Your doctor is:       Dr. Jacob Go, [facility]: [phone]               Or dial [phone] and ask for the resident on call for:  [facility]  For emergency care, call the:  [place]:  [phone] (TTY for hearing impaired: [phone])

## 2023-10-23 NOTE — ANESTHESIA CARE TRANSFER NOTE
[facility]    Procedure: Procedure(s):  RIGHT BREAST LUMPECTOMY LOCALIZED USING RADIOFREQUENCY IDENTIFICATION  RIGHT SENTINEL LYMPH NODE BIOPSY LOCALIZED USING RADIOFREQUENCY IDENTIFICATION       Diagnosis: Breast cancer (H) [C50.919]  Diagnosis Additional Information: No value filed.    Anesthesia Type:   General     Note:    Oropharynx: oropharynx clear of all foreign objects and spontaneously breathing  Level of Consciousness: awake  Oxygen Supplementation: face mask  Level of Supplemental Oxygen (L/min / FiO2): 6  Independent Airway: airway patency satisfactory and stable  Dentition: dentition unchanged  Vital Signs Stable: post-procedure vital signs reviewed and stable  Report to RN Given: handoff report given  Patient transferred to: PACU    Handoff Report: Identifed the Patient, Identified the Reponsible Provider, Reviewed the pertinent medical history, Discussed the surgical course, Reviewed Intra-OP anesthesia mangement and issues during anesthesia, Set expectations for post-procedure period and Allowed opportunity for questions and acknowledgement of understanding      Vitals:  Vitals Value Taken Time   /70    Temp 99.3    Pulse 78 10/23/23 1133   Resp 13 10/23/23 1133   SpO2 99 % 10/23/23 1133   Vitals shown include unfiled device data.    Electronically Signed By: JANIYA JALLOH  October 23, 2023  11:34 AM

## 2023-10-24 ENCOUNTER — PATIENT OUTREACH (OUTPATIENT)
Dept: ONCOLOGY | Facility: CLINIC | Age: 58
End: 2023-10-24
Payer: COMMERCIAL

## 2023-10-24 NOTE — PROGRESS NOTES
[facility]: Surgical Oncology Post-op Call Note                                     Discussion with Patient                                                           Surgery:      Seed localized right lumpectomy, lymphatic mapping, right sentinel lymph node biopsy x2, and mastopexy advancement flaps      Surgery date:  10/23/2023     Discharge Date:  10/23/2023    Date of Post-op Call:  10/24/2023       Left message for Marline following her surgery. Encouraged Marline to call with any questions or concerns. Contact info provided. Reviewed follow up appointment scheduled on 11/3 with Dr. Go.        Lexus Graham, RNCC  [facility]  Surgical Oncology     .

## 2023-10-27 ENCOUNTER — MYC MEDICAL ADVICE (OUTPATIENT)
Dept: ONCOLOGY | Facility: CLINIC | Age: 58
End: 2023-10-27
Payer: COMMERCIAL

## 2023-10-27 NOTE — TELEPHONE ENCOUNTER
Contacted pt and informed her per , okay to continue to monitor. Advised pt to call back if sx progress/worsen. Pt verbalized understanding.

## 2023-10-29 LAB
PATH REPORT.COMMENTS IMP SPEC: ABNORMAL
PATH REPORT.COMMENTS IMP SPEC: YES
PATH REPORT.FINAL DX SPEC: ABNORMAL
PATH REPORT.GROSS SPEC: ABNORMAL
PATH REPORT.MICROSCOPIC SPEC OTHER STN: ABNORMAL
PATH REPORT.RELEVANT HX SPEC: ABNORMAL
PATHOLOGY SYNOPTIC REPORT: ABNORMAL
PHOTO IMAGE: ABNORMAL

## 2023-11-03 ENCOUNTER — ONCOLOGY VISIT (OUTPATIENT)
Dept: ONCOLOGY | Facility: CLINIC | Age: 58
End: 2023-11-03
Attending: SURGERY
Payer: COMMERCIAL

## 2023-11-03 VITALS
TEMPERATURE: 98.7 F | OXYGEN SATURATION: 97 % | SYSTOLIC BLOOD PRESSURE: 137 MMHG | RESPIRATION RATE: 16 BRPM | HEART RATE: 100 BPM | BODY MASS INDEX: 24.31 KG/M2 | WEIGHT: 141.6 LBS | DIASTOLIC BLOOD PRESSURE: 78 MMHG

## 2023-11-03 DIAGNOSIS — C50.911 MALIGNANT NEOPLASM OF RIGHT FEMALE BREAST, UNSPECIFIED ESTROGEN RECEPTOR STATUS, UNSPECIFIED SITE OF BREAST (H): Primary | ICD-10-CM

## 2023-11-03 ASSESSMENT — PAIN SCALES - GENERAL: PAINLEVEL: MODERATE PAIN (4)

## 2023-11-03 NOTE — PROGRESS NOTES
Marline is here for postoperative visit after undergoing a right lumpectomy in the submuscular biopsy.  She is doing well since her surgery with no postoperative complications.  Both incisions are healing well with no evidence of infection.  Her pathology report demonstrated complete pathologic response in the breast and the lymph nodes.  She is scheduled to receive radiotherapy at [facility].  I will see her in the future if any problems arise.

## 2023-11-03 NOTE — NURSING NOTE
"Oncology Rooming Note    November 3, 2023 9:26 AM   Marline Ovalle is a 58 year old female who presents for:    Chief Complaint   Patient presents with    Oncology Clinic Visit     Post op     Initial Vitals: /78 (BP Location: Right arm, Patient Position: Sitting, Cuff Size: Adult Regular)   Pulse 100   Temp 98.7  F (37.1  C) (Oral)   Resp 16   Wt 64.2 kg (141 lb 9.6 oz)   SpO2 97%   BMI 24.31 kg/m   Estimated body mass index is 24.31 kg/m  as calculated from the following:    Height as of 10/23/23: 1.626 m (5' 4\").    Weight as of this encounter: 64.2 kg (141 lb 9.6 oz). Body surface area is 1.7 meters squared.  Moderate Pain (4) Comment: Data Unavailable   No LMP recorded. Patient is postmenopausal.  Allergies reviewed: Yes  Medications reviewed: Yes    Medications: Medication refills not needed today.  Pharmacy name entered into [employer]:    [id number] - [place], [place] - [address] [place], [place] -  [facility] [phone]    Clinical concerns: none.      Ezra Charles"

## 2023-11-03 NOTE — LETTER
11/3/2023         RE: Marline Ovalle  [address]  [place] [address]        Dear Colleague,    Thank you for referring your patient, Marline Ovalle, to the [facility]. Please see a copy of my visit note below.    Marline is here for postoperative visit after undergoing a right lumpectomy in the submuscular biopsy.  She is doing well since her surgery with no postoperative complications.  Both incisions are healing well with no evidence of infection.  Her pathology report demonstrated complete pathologic response in the breast and the lymph nodes.  She is scheduled to receive radiotherapy at [facility].  I will see her in the future if any problems arise.      Sincerely,        Jacob Go MD

## 2025-01-19 ENCOUNTER — HEALTH MAINTENANCE LETTER (OUTPATIENT)
Age: 60
End: 2025-01-19

## 2025-06-08 ENCOUNTER — HEALTH MAINTENANCE LETTER (OUTPATIENT)
Age: 60
End: 2025-06-08

## (undated) DEVICE — ESU GROUND PAD ADULT W/CORD E7507

## (undated) DEVICE — LINEN TOWEL PACK X5 5464

## (undated) DEVICE — DRAPE LAP TRANSVERSE 29421

## (undated) DEVICE — PROBE COVER INTRAOPERATIVE 5"X96" PC1308

## (undated) DEVICE — CLIP HORIZON MED BLUE 002200

## (undated) DEVICE — BASIN SET SINGLE STERILE 13752-624

## (undated) DEVICE — GLOVE PROTEXIS POWDER FREE SMT 8.0  2D72PT80X

## (undated) DEVICE — NDL BLUNT 18GA 1" W/O FILTER 305181

## (undated) DEVICE — SOL NACL 0.9% IRRIG 500ML BOTTLE 2F7123

## (undated) DEVICE — DRAPE IOBAN INCISE 23X17" 6650EZ

## (undated) DEVICE — PAD CHUX UNDERPAD 30X36" P3036C

## (undated) DEVICE — BNDG COHESIVE 2"X5YDS UNSTERILE ASSORTED COLORS LF 8962

## (undated) DEVICE — SET BREAST BIOPSY LOCALIZER 20 PROBE 8MM PENCIL 09-0006

## (undated) DEVICE — SU DERMABOND ADVANCED .7ML DNX12

## (undated) DEVICE — DRAPE SHEET MED 44X70" 9355

## (undated) DEVICE — NDL 25GA 2"  8881200441

## (undated) DEVICE — GLOVE BIOGEL PI MICRO INDICATOR UNDERGLOVE SZ 8.0 48980

## (undated) DEVICE — SPONGE RAY-TEC 4X4" 7317

## (undated) DEVICE — SU SILK 3-0 SH 30" K832H

## (undated) DEVICE — PREP PAD ALCOHOL 6818

## (undated) DEVICE — SYR 10ML FINGER CONTROL W/O NDL 309695

## (undated) DEVICE — PACK MINOR CUSTOM ASC

## (undated) DEVICE — ESU ELEC BLADE 2.75" COATED/INSULATED E1455

## (undated) DEVICE — PREP CHLORAPREP 26ML TINTED ORANGE  260815

## (undated) DEVICE — MARKER MARGIN MARKER STD 6 COLOR SGL USE MMS6

## (undated) DEVICE — SU VICRYL 3-0 SH 27" UND J416H

## (undated) DEVICE — SUCTION MANIFOLD NEPTUNE 2 SYS 1 PORT 702-025-000

## (undated) DEVICE — NDL 27GA 1.25" 305136

## (undated) DEVICE — CLIP HORIZON SM RED WIDE SLOT 001201

## (undated) DEVICE — SPECIMEN CONTAINER W/10% BUFFERED FORMALIN 120ML 591201

## (undated) DEVICE — SU PDS II 4-0 FS-2 27" Z422H

## (undated) DEVICE — SOL WATER IRRIG 500ML BOTTLE 2F7113

## (undated) RX ORDER — FENTANYL CITRATE 50 UG/ML
INJECTION, SOLUTION INTRAMUSCULAR; INTRAVENOUS
Status: DISPENSED
Start: 2023-10-23

## (undated) RX ORDER — ONDANSETRON 4 MG/1
TABLET, ORALLY DISINTEGRATING ORAL
Status: DISPENSED
Start: 2023-10-23

## (undated) RX ORDER — CLINDAMYCIN PHOSPHATE 900 MG/50ML
INJECTION, SOLUTION INTRAVENOUS
Status: DISPENSED
Start: 2023-10-23

## (undated) RX ORDER — KETOROLAC TROMETHAMINE 30 MG/ML
INJECTION, SOLUTION INTRAMUSCULAR; INTRAVENOUS
Status: DISPENSED
Start: 2023-10-23

## (undated) RX ORDER — PROPOFOL 10 MG/ML
INJECTION, EMULSION INTRAVENOUS
Status: DISPENSED
Start: 2023-10-23

## (undated) RX ORDER — BUPIVACAINE HYDROCHLORIDE 2.5 MG/ML
INJECTION, SOLUTION EPIDURAL; INFILTRATION; INTRACAUDAL
Status: DISPENSED
Start: 2023-10-23

## (undated) RX ORDER — FENTANYL CITRATE-0.9 % NACL/PF 10 MCG/ML
PLASTIC BAG, INJECTION (ML) INTRAVENOUS
Status: DISPENSED
Start: 2023-10-23

## (undated) RX ORDER — OXYCODONE HYDROCHLORIDE 5 MG/1
TABLET ORAL
Status: DISPENSED
Start: 2023-10-23